# Patient Record
Sex: FEMALE | Race: WHITE | NOT HISPANIC OR LATINO | URBAN - METROPOLITAN AREA
[De-identification: names, ages, dates, MRNs, and addresses within clinical notes are randomized per-mention and may not be internally consistent; named-entity substitution may affect disease eponyms.]

---

## 2017-01-31 ENCOUNTER — APPOINTMENT (RX ONLY)
Dept: URBAN - METROPOLITAN AREA CLINIC 170 | Facility: CLINIC | Age: 82
Setting detail: DERMATOLOGY
End: 2017-01-31

## 2017-01-31 DIAGNOSIS — Z41.9 ENCOUNTER FOR PROCEDURE FOR PURPOSES OTHER THAN REMEDYING HEALTH STATE, UNSPECIFIED: ICD-10-CM

## 2017-01-31 PROCEDURE — ? BOTOX

## 2017-01-31 ASSESSMENT — LOCATION ZONE DERM: LOCATION ZONE: FACE

## 2017-01-31 ASSESSMENT — LOCATION SIMPLE DESCRIPTION DERM: LOCATION SIMPLE: RIGHT CHEEK

## 2017-01-31 ASSESSMENT — LOCATION DETAILED DESCRIPTION DERM: LOCATION DETAILED: RIGHT MEDIAL MALAR CHEEK

## 2017-01-31 NOTE — PROCEDURE: BOTOX
Nasal Root Units: 0
Additional Area 1 Units: 27
Expiration Date (Month Year): 8/19
Detail Level: Zone
Post-Care Instructions: Patient instructed to not lie down for 4 hours and limit physical activity for 24 hours. Patient instructed not to travel by airplane for 48 hours.
Additional Area 1 Location: Face
Dilution (U/0.1 Cc): 2
Consent: Written consent obtained. Risks include but not limited to lid/brow ptosis, bruising, swelling, diplopia, temporary effect, incomplete chemical denervation.
Lot #: [id number]
Price (Use Numbers Only, No Special Characters Or $): 444

## 2017-05-09 ENCOUNTER — APPOINTMENT (RX ONLY)
Dept: URBAN - METROPOLITAN AREA CLINIC 170 | Facility: CLINIC | Age: 82
Setting detail: DERMATOLOGY
End: 2017-05-09

## 2017-05-09 DIAGNOSIS — Z41.9 ENCOUNTER FOR PROCEDURE FOR PURPOSES OTHER THAN REMEDYING HEALTH STATE, UNSPECIFIED: ICD-10-CM

## 2017-05-09 DIAGNOSIS — L85.3 XEROSIS CUTIS: ICD-10-CM

## 2017-05-09 PROCEDURE — ? TREATMENT REGIMEN

## 2017-05-09 PROCEDURE — ? BOTOX

## 2017-05-09 ASSESSMENT — LOCATION DETAILED DESCRIPTION DERM
LOCATION DETAILED: RIGHT PROXIMAL DORSAL FOREARM
LOCATION DETAILED: LEFT MEDIAL FOREHEAD
LOCATION DETAILED: LEFT PROXIMAL DORSAL FOREARM

## 2017-05-09 ASSESSMENT — LOCATION SIMPLE DESCRIPTION DERM
LOCATION SIMPLE: LEFT FOREHEAD
LOCATION SIMPLE: LEFT FOREARM
LOCATION SIMPLE: RIGHT FOREARM

## 2017-05-09 ASSESSMENT — LOCATION ZONE DERM
LOCATION ZONE: FACE
LOCATION ZONE: ARM

## 2017-05-09 NOTE — PROCEDURE: BOTOX
Lateral Platysmal Bands Units: 0
Price (Use Numbers Only, No Special Characters Or $): 470
Additional Area 1 Location: Face
Additional Area 1 Units: 37
Post-Care Instructions: Patient instructed to not lie down for 4 hours and limit physical activity for 24 hours. Patient instructed not to travel by airplane for 48 hours.
Lot #: [id number]
Consent: Written consent obtained. Risks include but not limited to lid/brow ptosis, bruising, swelling, diplopia, temporary effect, incomplete chemical denervation.
Dilution (U/0.1 Cc): 2
Detail Level: Zone
Expiration Date (Month Year): 12/19

## 2017-09-08 ENCOUNTER — APPOINTMENT (RX ONLY)
Dept: URBAN - METROPOLITAN AREA CLINIC 170 | Facility: CLINIC | Age: 82
Setting detail: DERMATOLOGY
End: 2017-09-08

## 2017-09-08 DIAGNOSIS — B00.1 HERPESVIRAL VESICULAR DERMATITIS: ICD-10-CM

## 2017-09-08 DIAGNOSIS — Z41.9 ENCOUNTER FOR PROCEDURE FOR PURPOSES OTHER THAN REMEDYING HEALTH STATE, UNSPECIFIED: ICD-10-CM

## 2017-09-08 DIAGNOSIS — L57.8 OTHER SKIN CHANGES DUE TO CHRONIC EXPOSURE TO NONIONIZING RADIATION: ICD-10-CM

## 2017-09-08 PROCEDURE — ? BOTOX

## 2017-09-08 PROCEDURE — ? FILLERS

## 2017-09-08 PROCEDURE — ? PRESCRIPTION

## 2017-09-08 PROCEDURE — ? TREATMENT REGIMEN

## 2017-09-08 RX ORDER — ACYCLOVIR 800 MG/1
TABLET ORAL
Qty: 60 | Refills: 2 | Status: ERX | COMMUNITY
Start: 2017-09-08

## 2017-09-08 RX ADMIN — ACYCLOVIR: 800 TABLET ORAL at 00:00

## 2017-09-08 ASSESSMENT — LOCATION SIMPLE DESCRIPTION DERM: LOCATION SIMPLE: LEFT FOREHEAD

## 2017-09-08 ASSESSMENT — LOCATION DETAILED DESCRIPTION DERM: LOCATION DETAILED: LEFT MEDIAL FOREHEAD

## 2017-09-08 ASSESSMENT — LOCATION ZONE DERM: LOCATION ZONE: FACE

## 2017-09-08 NOTE — PROCEDURE: BOTOX
Additional Area 5 Units: 0
Post-Care Instructions: Patient instructed to not lie down for 4 hours and limit physical activity for 24 hours. Patient instructed not to travel by airplane for 48 hours.
Lot #: [id number]
Additional Area 1 Units: 34
Additional Area 1 Location: Face
Dilution (U/0.1 Cc): 2
Expiration Date (Month Year): 3/20
Consent: Written consent obtained. Risks include but not limited to lid/brow ptosis, bruising, swelling, diplopia, temporary effect, incomplete chemical denervation.
Price (Use Numbers Only, No Special Characters Or $): 817
Detail Level: Zone

## 2017-09-08 NOTE — PROCEDURE: TREATMENT REGIMEN
Samples Given: Nia24 Intensive
Detail Level: Zone
Plan: Morning: Elta MD \\nEvening: Nia24 Intensive; apply a pea sized amount followed by Avene 0.1%

## 2017-09-08 NOTE — PROCEDURE: FILLERS
Tear Troughs Filler  Volume In Cc: 0
Lot #: [id number]
Filler: Juvederm Voluma XC
Expiration Date (Month Year): 5/31/18
Additional Area 1 Volume In Cc: 1
Anesthesia Type: 1% lidocaine with 1:100,000 epinephrine and a 1:10 solution of 8.4% sodium bicarbonate
Lot #: [id number]
Filler: Restylane Silk
Expiration Date (Month Year): 11/19/18
Additional Area 1 Location: Face
Price (Use Numbers Only, No Special Characters Or $): 2089
Map Statment: See Attach Map for Complete Details
Use Map Statement For Sites (Optional): No
Topical Anesthesia?: 2.5% lidocaine, 2.5% prilocaine
Post-Care Instructions: Patient instructed to apply ice to reduce swelling.
Detail Level: Zone
Consent: Written consent obtained. Risks include but not limited to bruising, beading, irregular texture, ulceration, infection, allergic reaction, scar formation, incomplete augmentation, temporary nature, procedural pain.

## 2018-01-23 ENCOUNTER — APPOINTMENT (RX ONLY)
Dept: URBAN - METROPOLITAN AREA CLINIC 170 | Facility: CLINIC | Age: 83
Setting detail: DERMATOLOGY
End: 2018-01-23

## 2018-01-23 DIAGNOSIS — L82.1 OTHER SEBORRHEIC KERATOSIS: ICD-10-CM

## 2018-01-23 DIAGNOSIS — Z41.9 ENCOUNTER FOR PROCEDURE FOR PURPOSES OTHER THAN REMEDYING HEALTH STATE, UNSPECIFIED: ICD-10-CM

## 2018-01-23 DIAGNOSIS — D22 MELANOCYTIC NEVI: ICD-10-CM

## 2018-01-23 PROBLEM — D48.5 NEOPLASM OF UNCERTAIN BEHAVIOR OF SKIN: Status: ACTIVE | Noted: 2018-01-23

## 2018-01-23 PROBLEM — D22.39 MELANOCYTIC NEVI OF OTHER PARTS OF FACE: Status: ACTIVE | Noted: 2018-01-23

## 2018-01-23 PROCEDURE — ? COUNSELING

## 2018-01-23 PROCEDURE — ? BIOPSY BY SHAVE METHOD

## 2018-01-23 PROCEDURE — 99212 OFFICE O/P EST SF 10 MIN: CPT | Mod: 25

## 2018-01-23 PROCEDURE — 11100: CPT

## 2018-01-23 PROCEDURE — ? PRESCRIPTION

## 2018-01-23 PROCEDURE — ? BOTOX

## 2018-01-23 RX ORDER — MUPIROCIN 20 MG/G
OINTMENT TOPICAL
Qty: 1 | Refills: 0 | Status: ERX | COMMUNITY
Start: 2018-01-23

## 2018-01-23 RX ADMIN — MUPIROCIN: 20 OINTMENT TOPICAL at 00:00

## 2018-01-23 ASSESSMENT — LOCATION DETAILED DESCRIPTION DERM
LOCATION DETAILED: INFERIOR MID FOREHEAD
LOCATION DETAILED: RIGHT MEDIAL MALAR CHEEK
LOCATION DETAILED: RIGHT SUPERIOR MEDIAL UPPER BACK

## 2018-01-23 ASSESSMENT — LOCATION SIMPLE DESCRIPTION DERM
LOCATION SIMPLE: RIGHT CHEEK
LOCATION SIMPLE: INFERIOR FOREHEAD
LOCATION SIMPLE: RIGHT UPPER BACK

## 2018-01-23 ASSESSMENT — LOCATION ZONE DERM
LOCATION ZONE: FACE
LOCATION ZONE: TRUNK

## 2018-01-23 NOTE — PROCEDURE: BIOPSY BY SHAVE METHOD
Additional Anticipated Plans: If malignant consider excision
Wound Care: Aquaphor
Bill For Surgical Tray: no
Silver Nitrate Text: The wound bed was treated with silver nitrate after the biopsy was performed.
Lab: [id number]
Anesthesia Type: 1% lidocaine without epinephrine
Lab Facility: [id number]
Size Of Lesion In Cm: 0.6
Biopsy Method: Personna blade
Anesthesia Volume In Cc (Will Not Render If 0): 1
Type Of Destruction Used: Curettage
Billing Type: Third-Party Bill
Biopsy Type: H and E
Consent: Written consent was obtained and risks were reviewed including but not limited to scarring, infection, bleeding, scabbing, incomplete removal, nerve damage and allergy to anesthesia.
Hemostasis: Aluminum Chloride
Detail Level: Detailed
Cryotherapy Text: The wound bed was treated with cryotherapy after the biopsy was performed.
X Size Of Lesion In Cm: 0
Curettage Text: The wound bed was treated with curettage after the biopsy was performed.
Electrodesiccation Text: The wound bed was treated with electrodesiccation after the biopsy was performed.
Post-Care Instructions: I reviewed with the patient in detail post-care instructions. Patient is to keep the biopsy site dry overnight, and then apply bacitracin twice daily until healed. Patient may apply hydrogen peroxide soaks to remove any crusting.
Notification Instructions: Patient will be notified of biopsy results. However, patient instructed to call the office if not contacted within 2 weeks.
Electrodesiccation And Curettage Text: The wound bed was treated with electrodesiccation and curettage after the biopsy was performed.
Dressing: bandage

## 2018-04-24 ENCOUNTER — APPOINTMENT (RX ONLY)
Dept: URBAN - METROPOLITAN AREA CLINIC 170 | Facility: CLINIC | Age: 83
Setting detail: DERMATOLOGY
End: 2018-04-24

## 2018-04-24 DIAGNOSIS — Z41.9 ENCOUNTER FOR PROCEDURE FOR PURPOSES OTHER THAN REMEDYING HEALTH STATE, UNSPECIFIED: ICD-10-CM

## 2018-04-24 PROCEDURE — ? FILLERS

## 2018-04-24 PROCEDURE — ? BOTOX

## 2018-04-24 ASSESSMENT — LOCATION SIMPLE DESCRIPTION DERM
LOCATION SIMPLE: RIGHT CHEEK
LOCATION SIMPLE: LEFT CHEEK

## 2018-04-24 ASSESSMENT — LOCATION DETAILED DESCRIPTION DERM
LOCATION DETAILED: LEFT INFERIOR MEDIAL MALAR CHEEK
LOCATION DETAILED: RIGHT CENTRAL MALAR CHEEK

## 2018-04-24 ASSESSMENT — LOCATION ZONE DERM: LOCATION ZONE: FACE

## 2018-04-24 NOTE — PROCEDURE: FILLERS
Additional Area 5 Volume In Cc: 0
Map Statment: See Attach Map for Complete Details
Additional Area 1 Location: Face
Filler: Juvederm Volbella XC
Consent: Written consent obtained. Risks include but not limited to bruising, beading, irregular texture, ulceration, infection, allergic reaction, scar formation, incomplete augmentation, temporary nature, procedural pain.
Expiration Date (Month Year): 12/16/19
Expiration Date (Month Year): 4/16/19
Include Cannula Information In Note?: No
Cheeks Filler Volume In Cc: 0.5
Post-Care Instructions: Patient instructed to apply ice to reduce swelling.
Lot #: [id number]
Price (Use Numbers Only, No Special Characters Or $): 6600
Detail Level: Zone
Expiration Date (Month Year): 1/9/19
Lot #: [id number] Sarina Eugene M.D. SHIRLEY***
Lot #: [id number]
Topical Anesthesia?: 2.5% lidocaine, 2.5% prilocaine
Filler: Juvederm Voluma XC

## 2018-04-24 NOTE — PROCEDURE: BOTOX
Masseter Units: 0
Additional Area 1 Location: Face
Additional Area 1 Units: 28
Post-Care Instructions: Patient instructed to not lie down for 4 hours and limit physical activity for 24 hours. Patient instructed not to travel by airplane for 48 hours.
Lot #: [id number]
Expiration Date (Month Year): 10/20
Price (Use Numbers Only, No Special Characters Or $): 398
Consent: Written consent obtained. Risks include but not limited to lid/brow ptosis, bruising, swelling, diplopia, temporary effect, incomplete chemical denervation.
Dilution (U/0.1 Cc): 2
Detail Level: Zone

## 2018-11-07 ENCOUNTER — APPOINTMENT (RX ONLY)
Dept: URBAN - METROPOLITAN AREA CLINIC 166 | Facility: CLINIC | Age: 83
Setting detail: DERMATOLOGY
End: 2018-11-07

## 2018-11-07 DIAGNOSIS — Z41.9 ENCOUNTER FOR PROCEDURE FOR PURPOSES OTHER THAN REMEDYING HEALTH STATE, UNSPECIFIED: ICD-10-CM

## 2018-11-07 PROCEDURE — ? BOTOX

## 2018-11-07 PROCEDURE — ? FILLERS

## 2018-11-07 ASSESSMENT — LOCATION SIMPLE DESCRIPTION DERM: LOCATION SIMPLE: LEFT CHEEK

## 2018-11-07 ASSESSMENT — LOCATION DETAILED DESCRIPTION DERM: LOCATION DETAILED: LEFT CENTRAL MALAR CHEEK

## 2018-11-07 ASSESSMENT — LOCATION ZONE DERM: LOCATION ZONE: FACE

## 2018-11-07 NOTE — PROCEDURE: FILLERS
Nasolabial Folds Filler Volume In Cc: 0
Include Cannula Information In Note?: No
Topical Anesthesia?: 2.5% lidocaine, 2.5% prilocaine
Lot #: [id number]
Additional Area 1 Location: face
Expiration Date (Month Year): 11/30/20
Detail Level: Zone
Anesthesia Volume In Cc: 0.3
Map Statment: See Attach Map for Complete Details
Post-Care Instructions: Patient instructed to apply ice to reduce swelling.
Expiration Date (Month Year): 5/31/18
Filler: Lui Adamson
Additional Area 1 Volume In Cc: 1
Anesthesia Type: 1% lidocaine with epinephrine and a 1:10 solution of 8.4% sodium bicarbonate
Include Cannula Size?: 25G
Lot #: [id number]
Price (Use Numbers Only, No Special Characters Or $): 1664
Include Cannula Length?: 1.5 inch
Consent: Written consent obtained. Risks include but not limited to bruising, beading, irregular texture, ulceration, infection, allergic reaction, scar formation, incomplete augmentation, temporary nature, procedural pain.
Filler: Restylane Silk
Lot #: [id number] Sarina Eugene M.D. SHIRLEY***
Expiration Date (Month Year): 1/9/19

## 2018-11-07 NOTE — PROCEDURE: BOTOX
Detail Level: Zone
Dilution (U/0.1 Cc): 1
Additional Area 1 Units: 27
Additional Area 3 Units: 0
Post-Care Instructions: Patient instructed to not lie down for 4 hours and limit physical activity for 24 hours. Patient instructed not to travel by airplane for 48 hours.
Lot #: [id number]
Expiration Date (Month Year): 4/21
Price (Use Numbers Only, No Special Characters Or $): 398
Additional Area 1 Location: Face
Consent: Written consent obtained. Risks include but not limited to lid/brow ptosis, bruising, swelling, diplopia, temporary effect, incomplete chemical denervation.

## 2019-05-07 ENCOUNTER — APPOINTMENT (RX ONLY)
Dept: URBAN - METROPOLITAN AREA CLINIC 170 | Facility: CLINIC | Age: 84
Setting detail: DERMATOLOGY
End: 2019-05-07

## 2019-05-07 DIAGNOSIS — Z41.9 ENCOUNTER FOR PROCEDURE FOR PURPOSES OTHER THAN REMEDYING HEALTH STATE, UNSPECIFIED: ICD-10-CM

## 2019-05-07 PROCEDURE — ? BOTOX

## 2019-05-07 NOTE — PROCEDURE: BOTOX
Levator Labii Superioris Units: 0
Consent: Written consent obtained. Risks include but not limited to lid/brow ptosis, bruising, swelling, diplopia, temporary effect, incomplete chemical denervation.
Expiration Date (Month Year): 9/21
Detail Level: Zone
Additional Area 1 Location: Face
Lot #: [id number]
Post-Care Instructions: Patient instructed to not lie down for 4 hours and limit physical activity for 24 hours. Patient instructed not to travel by airplane for 48 hours.
Price (Use Numbers Only, No Special Characters Or $): 502
Additional Area 1 Units: 31
Dilution (U/0.1 Cc): 1

## 2019-09-24 ENCOUNTER — APPOINTMENT (RX ONLY)
Dept: URBAN - METROPOLITAN AREA CLINIC 170 | Facility: CLINIC | Age: 84
Setting detail: DERMATOLOGY
End: 2019-09-24

## 2019-09-24 DIAGNOSIS — Z41.9 ENCOUNTER FOR PROCEDURE FOR PURPOSES OTHER THAN REMEDYING HEALTH STATE, UNSPECIFIED: ICD-10-CM

## 2019-09-24 DIAGNOSIS — L57.8 OTHER SKIN CHANGES DUE TO CHRONIC EXPOSURE TO NONIONIZING RADIATION: ICD-10-CM

## 2019-09-24 PROCEDURE — ? PRESCRIPTION

## 2019-09-24 PROCEDURE — ? FILLERS

## 2019-09-24 PROCEDURE — ? BOTOX

## 2019-09-24 PROCEDURE — ? TREATMENT REGIMEN

## 2019-09-24 PROCEDURE — 99212 OFFICE O/P EST SF 10 MIN: CPT

## 2019-09-24 RX ORDER — PHARMACY COMPOUNDING ACCESSORY
EACH MISCELLANEOUS
Qty: 1 | Refills: 6 | Status: ERX | COMMUNITY
Start: 2019-09-24

## 2019-09-24 RX ADMIN — Medication: at 00:00

## 2019-09-24 ASSESSMENT — LOCATION SIMPLE DESCRIPTION DERM: LOCATION SIMPLE: LEFT CHEEK

## 2019-09-24 ASSESSMENT — LOCATION ZONE DERM: LOCATION ZONE: FACE

## 2019-09-24 ASSESSMENT — LOCATION DETAILED DESCRIPTION DERM
LOCATION DETAILED: LEFT INFERIOR CENTRAL MALAR CHEEK
LOCATION DETAILED: LEFT CENTRAL MALAR CHEEK

## 2019-09-24 NOTE — PROCEDURE: BOTOX
Post-Care Instructions: Patient instructed to not lie down for 4 hours and limit physical activity for 24 hours. Patient instructed not to travel by airplane for 48 hours.
Glabellar Complex Units: 0
Dilution (U/0.1 Cc): 1
Price (Use Numbers Only, No Special Characters Or $): 450
Lot #: [id number]
Additional Area 1 Location: Face
Detail Level: Zone
Expiration Date (Month Year): 11/21
Additional Area 1 Units: 25
Consent: Written consent obtained. Risks include but not limited to lid/brow ptosis, bruising, swelling, diplopia, temporary effect, incomplete chemical denervation.

## 2019-09-24 NOTE — PROCEDURE: FILLERS
Jawline Filler Volume In Cc: 0
Topical Anesthesia?: 2.5% lidocaine, 2.5% prilocaine
Lot #: [id number] Sarina Eugene M.D. SHIRLEY***
Expiration Date (Month Year): 1/9/19
Filler: Lui Adamson
Include Cannula Information In Note?: No
Detail Level: Zone
Cheeks Filler Volume In Cc: 1
Price (Use Numbers Only, No Special Characters Or $): 135
Lot #: [id number]
Consent: Written consent obtained. Risks include but not limited to bruising, beading, irregular texture, ulceration, infection, allergic reaction, scar formation, incomplete augmentation, temporary nature, procedural pain.
Lot #: [id number]
Additional Area 1 Location: face
Map Statment: See Attach Map for Complete Details
Post-Care Instructions: Patient instructed to apply ice to reduce swelling.
Expiration Date (Month Year): 1-
Expiration Date (Month Year): 5/31/2020
Anesthesia Type: 1% lidocaine with epinephrine and a 1:10 solution of 8.4% sodium bicarbonate
Anesthesia Volume In Cc: 0.1
Include Cannula Size?: 25G
Include Cannula Length?: 1.5 inch

## 2019-09-24 NOTE — PROCEDURE: TREATMENT REGIMEN
Plan: Elta 40 every morning , Wilma Intensive Recovery Cream every night at bedtime over top of tretinoin 0.025.
Detail Level: Simple

## 2019-10-15 ENCOUNTER — APPOINTMENT (RX ONLY)
Dept: URBAN - METROPOLITAN AREA CLINIC 170 | Facility: CLINIC | Age: 84
Setting detail: DERMATOLOGY
End: 2019-10-15

## 2019-10-15 DIAGNOSIS — Z41.9 ENCOUNTER FOR PROCEDURE FOR PURPOSES OTHER THAN REMEDYING HEALTH STATE, UNSPECIFIED: ICD-10-CM

## 2019-10-15 PROCEDURE — ? BOTOX

## 2019-10-15 PROCEDURE — ? FILLERS

## 2019-10-15 NOTE — PROCEDURE: FILLERS
Lateral Face Filler  Volume In Cc: 0
Filler Comments: *** [facility] Special *88
Anesthesia Type: 1% lidocaine with epinephrine and a 1:10 solution of 8.4% sodium bicarbonate
Consent: Written consent obtained. Risks include but not limited to bruising, beading, irregular texture, ulceration, infection, allergic reaction, scar formation, incomplete augmentation, temporary nature, procedural pain.
Additional Area 1 Location: face
Post-Care Instructions: Patient instructed to apply ice to reduce swelling.
Include Cannula Size?: 25G
Include Cannula Length?: 1.5 inch
Additional Area 1 Volume In Cc: 0.1
Lot #: [id number]
Expiration Date (Month Year): 5/31/2020
Include Cannula Information In Note?: No
Detail Level: Zone
Filler: Belotero
Lot #: [id number] Sarina Eugene M.D. SHIRLEY***
Expiration Date (Month Year): 1/9/19
Lot #: [id number]
Map Statment: See Attach Map for Complete Details
Expiration Date (Month Year): 05/09/20

## 2019-10-15 NOTE — PROCEDURE: BOTOX
Show Forehead Units: Yes
Show Ucl Units: No
Price (Use Numbers Only, No Special Characters Or $): 0
Glabellar Complex Units: 1
Lot #: [id number]
Consent: Written consent obtained. Risks include but not limited to lid/brow ptosis, bruising, swelling, diplopia, temporary effect, incomplete chemical denervation.
Post-Care Instructions: Patient instructed to not lie down for 4 hours and limit physical activity for 24 hours. Patient instructed not to travel by airplane for 48 hours.
Detail Level: Zone
Expiration Date (Month Year): 2/22
Additional Area 1 Location: Face

## 2020-09-21 ENCOUNTER — APPOINTMENT (RX ONLY)
Dept: URBAN - METROPOLITAN AREA CLINIC 170 | Facility: CLINIC | Age: 85
Setting detail: DERMATOLOGY
End: 2020-09-21

## 2020-09-21 DIAGNOSIS — L98.0 PYOGENIC GRANULOMA: ICD-10-CM

## 2020-09-21 PROBLEM — D48.5 NEOPLASM OF UNCERTAIN BEHAVIOR OF SKIN: Status: ACTIVE | Noted: 2020-09-21

## 2020-09-21 PROCEDURE — 11102 TANGNTL BX SKIN SINGLE LES: CPT

## 2020-09-21 PROCEDURE — ? BIOPSY BY SHAVE METHOD

## 2020-09-21 ASSESSMENT — LOCATION SIMPLE DESCRIPTION DERM: LOCATION SIMPLE: NOSE

## 2020-09-21 ASSESSMENT — LOCATION ZONE DERM: LOCATION ZONE: NOSE

## 2020-09-21 ASSESSMENT — LOCATION DETAILED DESCRIPTION DERM: LOCATION DETAILED: NASAL DORSUM

## 2020-09-21 NOTE — HPI: SKIN LESION
What Type Of Note Output Would You Prefer (Optional)?: Bullet Format
Is This A New Presentation, Or A Follow-Up?: Skin Lesion
Additional History: Patient states three weeks ago she had a possible infection at the same site as today’s lesion of concern. Patient treated with hydrogen peroxide and alcohol for several days which decreased the redness but not much improvement

## 2020-10-15 ENCOUNTER — APPOINTMENT (RX ONLY)
Dept: URBAN - METROPOLITAN AREA CLINIC 173 | Facility: CLINIC | Age: 85
Setting detail: DERMATOLOGY
End: 2020-10-15

## 2020-10-15 DIAGNOSIS — Z41.9 ENCOUNTER FOR PROCEDURE FOR PURPOSES OTHER THAN REMEDYING HEALTH STATE, UNSPECIFIED: ICD-10-CM

## 2020-10-15 PROCEDURE — ? BOTOX

## 2020-10-15 PROCEDURE — ? FILLERS

## 2020-10-15 NOTE — PROCEDURE: FILLERS
Dorsal Hands Filler  Volume In Cc: 0
Map Statment: See Attach Map for Complete Details
Lot #: [id number]
Include Cannula Information In Note?: No
Lot #: [id number]
Expiration Date (Month Year): 1/31/2023
Lot #: [id number]
Include Cannula Information In Note?: Yes
Anesthesia Type: 1% lidocaine with epinephrine and a 1:10 solution of 8.4% sodium bicarbonate
Expiration Date (Month Year): 08/31/2020
Include Cannula Size?: 25G
Expiration Date (Month Year): 04/30/2021
Anesthesia Volume In Cc: 0.3
Include Cannula Length?: 1.5 inch
Topical Anesthesia?: EMLA
Additional Area 1 Location: Face
Additional Area 2 Location: Hands
Consent: Written consent obtained. Risks include but not limited to bruising, beading, irregular texture, ulceration, infection, allergic reaction, scar formation, incomplete augmentation, temporary nature, procedural pain.
Detail Level: Zone
Post-Care Instructions: Patient instructed to apply ice to reduce swelling.
Additional Area 2 Location: ear lobes
Filler: Lui Adamson
Cheeks Filler Volume In Cc: 1
Price (Use Numbers Only, No Special Characters Or $): 900

## 2020-10-19 NOTE — PROCEDURE: BOTOX
Expiration Date (Month Year): 8/20
Inferior Lateral Orbicularis Oculi Units: 0
Post-Care Instructions: Patient instructed to not lie down for 4 hours and limit physical activity for 24 hours. Patient instructed not to travel by airplane for 48 hours.
Price (Use Numbers Only, No Special Characters Or $): 510
Detail Level: Zone
Lot #: [id number]
Consent: Written consent obtained. Risks include but not limited to lid/brow ptosis, bruising, swelling, diplopia, temporary effect, incomplete chemical denervation.
Additional Area 1 Units: 35
Additional Area 1 Location: Face
Dilution (U/0.1 Cc): 2
cor reg  lungs clear  abd soft  AO X3

## 2021-05-07 ENCOUNTER — APPOINTMENT (RX ONLY)
Dept: URBAN - METROPOLITAN AREA CLINIC 173 | Facility: CLINIC | Age: 86
Setting detail: DERMATOLOGY
End: 2021-05-07

## 2021-05-07 DIAGNOSIS — Z41.9 ENCOUNTER FOR PROCEDURE FOR PURPOSES OTHER THAN REMEDYING HEALTH STATE, UNSPECIFIED: ICD-10-CM

## 2021-05-07 PROCEDURE — ? BOTOX

## 2021-05-07 PROCEDURE — ? OTHER (COSMETIC)

## 2021-05-07 PROCEDURE — ? PRESCRIPTION

## 2021-05-07 RX ORDER — LIDOCAINE AND PRILOCAINE 25; 25 MG/G; MG/G
CREAM TOPICAL
Qty: 1 | Refills: 2 | Status: ERX | COMMUNITY
Start: 2021-05-07

## 2021-05-07 RX ADMIN — LIDOCAINE AND PRILOCAINE: 25; 25 CREAM TOPICAL at 00:00

## 2021-05-07 NOTE — PROCEDURE: OTHER (COSMETIC)
Other (Free Text): Filler for fine lines was deferred Patient had no numbing cream on.
Detail Level: Zone

## 2021-05-07 NOTE — PROCEDURE: BOTOX
Show Topical Anesthesia: Yes
Lcl Root Units: 0
Additional Area 1 Units: 41
Show Lcl Units: No
Expiration Date (Month Year): 7/23
Detail Level: Zone
Additional Area 2 Location: Upper cutaneous lip
Price (Use Numbers Only, No Special Characters Or $): 344
Lot #: [id number]
Consent: Written consent obtained. Risks include but not limited to lid/brow ptosis, bruising, swelling, diplopia, temporary effect, incomplete chemical denervation.
Additional Area 3 Location: Chin
Dilution (U/0.1 Cc): 1
Post-Care Instructions: Patient instructed to not lie down for 4 hours and limit physical activity for 24 hours. Patient instructed not to travel by airplane for 48 hours.
Additional Area 1 Location: Face

## 2021-06-11 ENCOUNTER — APPOINTMENT (RX ONLY)
Dept: URBAN - METROPOLITAN AREA CLINIC 167 | Facility: CLINIC | Age: 86
Setting detail: DERMATOLOGY
End: 2021-06-11

## 2021-06-11 DIAGNOSIS — D22 MELANOCYTIC NEVI: ICD-10-CM

## 2021-06-11 DIAGNOSIS — Z71.89 OTHER SPECIFIED COUNSELING: ICD-10-CM

## 2021-06-11 DIAGNOSIS — L82.1 OTHER SEBORRHEIC KERATOSIS: ICD-10-CM

## 2021-06-11 PROBLEM — D22.5 MELANOCYTIC NEVI OF TRUNK: Status: ACTIVE | Noted: 2021-06-11

## 2021-06-11 PROCEDURE — 99213 OFFICE O/P EST LOW 20 MIN: CPT

## 2021-06-11 PROCEDURE — ? COUNSELING

## 2021-06-11 ASSESSMENT — LOCATION DETAILED DESCRIPTION DERM: LOCATION DETAILED: INFERIOR THORACIC SPINE

## 2021-06-11 ASSESSMENT — LOCATION ZONE DERM: LOCATION ZONE: TRUNK

## 2021-06-11 ASSESSMENT — LOCATION SIMPLE DESCRIPTION DERM: LOCATION SIMPLE: UPPER BACK

## 2022-02-17 ENCOUNTER — APPOINTMENT (RX ONLY)
Dept: URBAN - METROPOLITAN AREA CLINIC 167 | Facility: CLINIC | Age: 87
Setting detail: DERMATOLOGY
End: 2022-02-17

## 2022-02-17 DIAGNOSIS — L82.1 OTHER SEBORRHEIC KERATOSIS: ICD-10-CM

## 2022-02-17 PROCEDURE — ? COUNSELING

## 2022-02-17 PROCEDURE — 99212 OFFICE O/P EST SF 10 MIN: CPT

## 2022-02-17 ASSESSMENT — LOCATION DETAILED DESCRIPTION DERM: LOCATION DETAILED: LEFT SUPERIOR UPPER BACK

## 2022-02-17 ASSESSMENT — LOCATION SIMPLE DESCRIPTION DERM: LOCATION SIMPLE: LEFT UPPER BACK

## 2022-02-17 ASSESSMENT — LOCATION ZONE DERM: LOCATION ZONE: TRUNK

## 2022-02-17 NOTE — HPI: EVALUATION OF SKIN LESION(S)
Hpi Title: Evaluation of Skin Lesions
Additional History: Pt has lesions that she is concerned about around the bra line